# Patient Record
(demographics unavailable — no encounter records)

---

## 2024-12-05 NOTE — HISTORY OF PRESENT ILLNESS
[FreeTextEntry1] : Favor Nevus Sebaceous Left Cheek [de-identified] : Dermatologic Surgery Consultation  12/03/2024   Referred by: Dr. Marie  We had the pleasure of seeing your patient in consultation for Dermatologic Surgery.  Mr. RED RUBY is a 23 year old M who presents for evaluation of suspected nevus sebaceous. Has been there x most of life, it is itchy and bothersome. No prior bx or treatment.   Pertinent positives noted below  History of HIV or hepatitis: No Blood thinners: none Antibiotic Prophylaxis: None  Medical implants: None  Social History: no tobacco or alcohol   The patient's review of systems questionnaire was reviewed. Education needs were identified. There were no barriers to learning.

## 2024-12-05 NOTE — PHYSICAL EXAM
[Alert] : alert [Oriented x 3] : ~L oriented x 3 [Well Nourished] : well nourished [Conjunctiva Non-injected] : conjunctiva non-injected [No Visual Lymphadenopathy] : no visual  lymphadenopathy [No Clubbing] : no clubbing [No Edema] : no edema [No Bromhidrosis] : no bromhidrosis [No Chromhidrosis] : no chromhidrosis [Hair] : Hair [Scalp] : Scalp [Face] : Face [Nose] : Nose [Eyelids] : Eyelids [Ears] : Ears [FreeTextEntry3] : -left lateral cheek with 4 cm x 2.5 cm waxy papillated plaque

## 2024-12-05 NOTE — ASSESSMENT
[FreeTextEntry1] : Favor Nevus Sebaceous, Left Cheek -discussed that die to large size surgical excision would leave a significant scar -recommend he have consultation for possible ablative laser treatment with Dr Kinsey or Dr Haddad at the [place] office -patient in agreement will call to schedule shiv Robles MD Physician, Dermatology & Dermatologic [facility]